# Patient Record
Sex: MALE | Race: WHITE | NOT HISPANIC OR LATINO | Employment: FULL TIME | URBAN - METROPOLITAN AREA
[De-identification: names, ages, dates, MRNs, and addresses within clinical notes are randomized per-mention and may not be internally consistent; named-entity substitution may affect disease eponyms.]

---

## 2024-04-08 ENCOUNTER — TELEPHONE (OUTPATIENT)
Dept: UROLOGY | Facility: CLINIC | Age: 64
End: 2024-04-08
Payer: COMMERCIAL

## 2024-04-08 NOTE — TELEPHONE ENCOUNTER
Caller: Grayson Anderson    Relationship to patient: Self    Best call back number: [phone]    Patient is needing: PT STATED HE IS BEING SEEN FOR ED. PT HAS BEEN RESCHEDULED.

## 2024-04-08 NOTE — TELEPHONE ENCOUNTER
PT IS SCHEDULED WITH JAMES HOYOS FOR TOMORROW 4/9/24 WE NEED TO KNOW IF PT IS BEING SEEN FOR JUST ED, IF HE NEEDS TESTOSTERONE MANAGEMENT JAMES DOES NOT SEE FOR THAT AND PT WOULD NEED TO SEE EDEL GHOSH. PLEASE VERIFY AND RESCHEDULED PT ACCORDINGLY. CALLED PT NO ANSWER, LMOM TO CALL US BACK.